# Patient Record
Sex: FEMALE | Race: WHITE
[De-identification: names, ages, dates, MRNs, and addresses within clinical notes are randomized per-mention and may not be internally consistent; named-entity substitution may affect disease eponyms.]

---

## 2019-02-28 ENCOUNTER — HOSPITAL ENCOUNTER (OUTPATIENT)
Dept: HOSPITAL 61 - SURG | Age: 54
Setting detail: OBSERVATION
LOS: 1 days | Discharge: HOME | End: 2019-03-01
Attending: OTOLARYNGOLOGY | Admitting: OTOLARYNGOLOGY
Payer: COMMERCIAL

## 2019-02-28 VITALS — DIASTOLIC BLOOD PRESSURE: 89 MMHG | SYSTOLIC BLOOD PRESSURE: 145 MMHG

## 2019-02-28 VITALS — SYSTOLIC BLOOD PRESSURE: 139 MMHG | DIASTOLIC BLOOD PRESSURE: 89 MMHG

## 2019-02-28 VITALS — DIASTOLIC BLOOD PRESSURE: 90 MMHG | SYSTOLIC BLOOD PRESSURE: 132 MMHG

## 2019-02-28 VITALS — SYSTOLIC BLOOD PRESSURE: 135 MMHG | DIASTOLIC BLOOD PRESSURE: 80 MMHG

## 2019-02-28 VITALS — WEIGHT: 191 LBS | BODY MASS INDEX: 29.98 KG/M2 | HEIGHT: 67 IN

## 2019-02-28 VITALS — SYSTOLIC BLOOD PRESSURE: 133 MMHG | DIASTOLIC BLOOD PRESSURE: 80 MMHG

## 2019-02-28 VITALS — SYSTOLIC BLOOD PRESSURE: 123 MMHG | DIASTOLIC BLOOD PRESSURE: 73 MMHG

## 2019-02-28 DIAGNOSIS — I10: ICD-10-CM

## 2019-02-28 DIAGNOSIS — E11.9: ICD-10-CM

## 2019-02-28 DIAGNOSIS — E04.2: Primary | ICD-10-CM

## 2019-02-28 DIAGNOSIS — Z90.710: ICD-10-CM

## 2019-02-28 LAB
CALCIUM PTH: 9.1 MG/DL (ref 8.7–10.2)
CREATININE PTH: 0.61 MG/DL (ref 0.57–1)
PHOSPHORUS PTH: 3.1 MG/DL (ref 2.5–4.5)
PTH-INTACT SERPL-MCNC: 31 PG/ML (ref 15–65)

## 2019-02-28 PROCEDURE — A7015 AEROSOL MASK USED W NEBULIZE: HCPCS

## 2019-02-28 PROCEDURE — 96374 THER/PROPH/DIAG INJ IV PUSH: CPT

## 2019-02-28 PROCEDURE — 88331 PATH CONSLTJ SURG 1 BLK 1SPC: CPT

## 2019-02-28 PROCEDURE — 85025 COMPLETE CBC W/AUTO DIFF WBC: CPT

## 2019-02-28 PROCEDURE — G0378 HOSPITAL OBSERVATION PER HR: HCPCS

## 2019-02-28 PROCEDURE — G0379 DIRECT REFER HOSPITAL OBSERV: HCPCS

## 2019-02-28 PROCEDURE — 88305 TISSUE EXAM BY PATHOLOGIST: CPT

## 2019-02-28 PROCEDURE — 36415 COLL VENOUS BLD VENIPUNCTURE: CPT

## 2019-02-28 PROCEDURE — 83970 ASSAY OF PARATHORMONE: CPT

## 2019-02-28 PROCEDURE — 60240 REMOVAL OF THYROID: CPT

## 2019-02-28 PROCEDURE — 80048 BASIC METABOLIC PNL TOTAL CA: CPT

## 2019-02-28 PROCEDURE — 82962 GLUCOSE BLOOD TEST: CPT

## 2019-02-28 PROCEDURE — 82310 ASSAY OF CALCIUM: CPT

## 2019-02-28 PROCEDURE — 96376 TX/PRO/DX INJ SAME DRUG ADON: CPT

## 2019-02-28 PROCEDURE — 83735 ASSAY OF MAGNESIUM: CPT

## 2019-02-28 RX ADMIN — LABETALOL HYDROCHLORIDE PRN MG: 5 INJECTION, SOLUTION INTRAVENOUS at 16:54

## 2019-02-28 RX ADMIN — DOCUSATE SODIUM SCH MG: 100 CAPSULE, LIQUID FILLED ORAL at 21:00

## 2019-02-28 RX ADMIN — BENZOCAINE AND MENTHOL PRN LOZ: 15; 3.6 LOZENGE ORAL at 22:04

## 2019-02-28 RX ADMIN — MORPHINE SULFATE PRN MG: 2 INJECTION, SOLUTION INTRAMUSCULAR; INTRAVENOUS at 18:13

## 2019-02-28 RX ADMIN — BENZOCAINE AND MENTHOL PRN LOZ: 15; 3.6 LOZENGE ORAL at 18:07

## 2019-02-28 RX ADMIN — MUPIROCIN SCH APP: 20 OINTMENT TOPICAL at 22:19

## 2019-02-28 RX ADMIN — LABETALOL HYDROCHLORIDE PRN MG: 5 INJECTION, SOLUTION INTRAVENOUS at 17:28

## 2019-02-28 NOTE — PDOC4
IMMEDIATE POST OP NOTE


Date:  Feb 28, 2019


Pre-Op Diagnosis


multinodular goiter


Post-Op Diagnosis


same as above


Procedure Performed


total thyroidectomy


Surgeon


Dr. Jennifer Cuenca


Assistant


none


Anesthesiologist


Dr. Correa


Anesthesia Type:  General


Blood Loss


50mL


Specimens Obtained


1.  left thyroid lobe and isthmus


2.  Right thyroid lobe


3.  Left thyroid tissue r/o parathyroid


Findings


1.  Large multinodular goiter on left side with dominant nodules measuring >4cm


2.  Multiple small nodule present within right thyroid gland


3.  Recurrent laryngeal nerves intact bilaterally


4.  Left parathyroid tissue was attached to thyroid specimen at the end of the 

case.  This was reimplanted within left sternocleidomastoid muscle


Complications


none


Operative Note


#[id number]











JENNIFER CUENCA MD Feb 28, 2019 16:29

## 2019-02-28 NOTE — OP
DATE OF SURGERY:  02/28/2019



PREOPERATIVE DIAGNOSIS:  Multinodular thyroid goiter.



POSTOPERATIVE DIAGNOSIS:  Multinodular thyroid goiter.



PROCEDURE PERFORMED:  Total thyroidectomy.



SURGEON:  Jennifer Cuenca MD



ANESTHESIA:  General endotracheal anesthesia.



INDICATIONS FOR SURGERY:  The patient is a 54-year-old female with a history of

very enlarged left thyroid nodule that measures greater than 4 cm.  The patient

was found to have multiple nodules bilaterally.  Left side is beginning to

significantly enlarged and causing compressive symptoms.  The patient had an

FNA, which was consistent with a benign thyroid nodule.  Due to the continuing

growth and enlarged thyroid gland bilaterally, the decision made for the patient

to undergo the above procedure after the risks, benefits, and alternatives of

surgery were thoroughly discussed with the patient and informed consent was

obtained.



ESTIMATED BLOOD LOSS:  50 mL.



ANESTHESIOLOGIST:  Valdemar Correa MD



SPECIMENS OBTAINED:  The left thyroid lobe and isthmus, the right thyroid lobe

and left thyroid tissue that was sent to rule out parathyroid tissue.



INTRAOPERATIVE FINDINGS:

1.  A large multinodular goiter was present on the left side with a dominant

nodule inferiorly measuring approximately 4 cm.

2.  Multiple smaller nodules present within the right thyroid gland and isthmus.

3.  The recurrent laryngeal nerves were intact at the end of the case and

stimulated at 0.5 milliamps.

4.  Left parathyroid tissue was found attached to the left thyroid specimen at 
the

end of the case.  A section was sent for frozen pathology, which confirmed

parathyroid tissue and this was reimplanted within the left sternocleidomastoid

musculature.



DESCRIPTION OF THE PROCEDURE:  The patient was brought back to the operating

room by Anesthesia and intubated with a nerve integrity monitor, endotracheal

tube in a standard fashion.  A shoulder roll was placed on the patient's

shoulders and a 5 cm incision was planned approximately 2 fingerbreadths above

the sternal notch.  This was injected in a subcutaneous fashion with 1%

lidocaine with 1:100,000 epinephrine.  The patient was then prepped and draped

in standard fashion.  Nerve integrity monitor was also set up and verified for

accuracy.  This was used throughout the case to protect the recurrent laryngeal

nerves.  A 15 blade was then used to cut down to the skin and subcutaneous

tissue.  Bovie electrocautery was further used to cut down through the

subcutaneous fat and subplatysmal layers.  Subplatysmal flaps were then elevated

superiorly to the thyroid notch, inferiorly to the sternal notch, laterally to

the sternocleidomastoid muscles bilaterally.  Mauricio retractor was placed to

protect the skin edges throughout the case.  The strap musculature was then

 at the midline and I dissected the strap musculature off the anterior

surface of the left thyroid gland.  Left thyroid gland was severely enlarged

with multiple thyroid nodules present.  I carefully dissected down to the

superior pole of the thyroid gland using LigaSure cautery.  I also dissected on

the lateral aspect dissecting through the middle thyroid vein.  We gently

retracted the gland medially.  In doing so, we did compress one of the largest

thyroid nodules that was present superiorly.  I identified on the inferior

undersurface of the thyroid gland.  I identified the recurrent laryngeal nerve

and traced it until I entered into the larynx at the cricothyroid notch.  Using

bipolar electrocautery, I dissected the thyroid specimen away from the recurrent

laryngeal nerve.  I dissected through Berry ligament using bipolar

electrocautery as well as a 15 blade.  Once I was carefully away from the

recurrent laryngeal nerve, I continued to dissect the large thyroid specimen

away  from the surrounding tissue using LigaSure cautery.  At the paratracheal

tissue, I used Bovie electrocautery to dissect the thyroid gland away from the

paratracheal tissue.  I continued my dissection towards the midline and then cut

through the thyroid isthmus using LigaSure cautery.  I inspected this large

thyroid specimen on the back table.  There was a tissue attached to the deep

surface of the thyroid gland that was concerning for possible parathyroid

tissue.  I dissected this away from the thyroid tissue and took a small piece

and send it to the lab for pathology.  Frozen section confirmed a parathyroid 
tissue.  The

remaining parathyroid gland was placed in saline for the use at the end of

the case.  I then examined the surgical bed on the left side.  Adequate

hemostasis was confirmed using bipolar electrocautery.  I did identify tissue

that appeared to be the superior parathyroid tissue that was intact at the end

of the case and had a good vascular supply.  The recurrent laryngeal was again

identified and stimulated at 0.5 milliamps.  



My attention was then taken to the right thyroid gland.  In a similar fashion, 
I dissected the strap musculature

off the anterior surface of the right thyroid gland.  There were multiple

nodules present on the right thyroid gland, but this gland was not nearly as

enlarged as the left.  I then carefully dissected around the superior pole of

the thyroid gland using LigaSure cautery to dissect through the superior

vascular bundle.  I also dissected through the middle thyroid vein laterally and

retracted the gland medially.  I carefully used bipolar electrocautery to

dissect on the deep surface of the thyroid gland.  I identified parathyroid

tissue.  This was carefully dissected away from the thyroid gland.  I also

identified the recurrent laryngeal nerve and traced it until I entered into the

larynx at the cricothyroid notch.  I carefully used bipolar electrocautery to

dissect the thyroid gland away from the recurrent laryngeal nerve.  I also

dissected through Berry ligament using bipolar cautery as well as a 15 blade. 

Once I was safely away from the recurrent laryngeal nerve, I dissected the

remaining aspect of the thyroid gland away from the surrounding tissue using

LigaSure cautery.  I dissected the paratracheal tissue off of the trachea using

Bovie electrocautery.  The right thyroid specimen was completely removed.  This

was then again inspected at the back table.  There was no evidence of attached

parathyroid tissue.  This was sent for permanent pathology.  I then again

inspected the surgical bed.  I used bipolar electrocautery to obtain hemostasis.

 The recurrent laryngeal nerve was again identified on the right side and it

stimulated at 0.5 milliamps at the end of the case.  The parathyroid tissue that

was right near the recurrent laryngeal nerve as it entered into the larynx,

again appeared healthy at this point in the case.  I ensured adequate hemostasis

on the left side as well.  Tami powder was placed within the surgical bed

bilaterally.  A 10-[place] fully fluted drain was placed within the surgical bed.

 This was brought out through the midline of the strap musculature.  The strap

musculature was closed with a 3-0 Vicryl suture.  The Mauricio retractor was

removed.  I then made a stab incision just left lateral to my incision and the

drain was brought out through the stab incision.  I sutured the drain to the

skin using 3-0 nylon suture.  I ensured adequate hemostasis in the subplatysmal

layer.  The platysma and deep dermal layers were then closed with buried

interrupted sutures of 4-0 Vicryl and a running horizontal mattress suture of

5-0 Prolene was used to close the skin.  Mupirocin ointment was placed along the

incision.  The patient was now turned back over to Anesthesia and extubated

without complication.  Our sponge and instrument counts correct at the end of

the case.



COMPLICATIONS:  None.



DISPOSITION:  Stable and transferred to recovery.

 



______________________________

JENNIFER CUENCA MD



DR:  ISRAEL/reid  JOB#:  [id number] / [id number]

DD:  02/28/2019 16:29  DT:  02/28/2019 17:43

FINA

## 2019-02-28 NOTE — NUR
Pt arrives via bed from PACU. Pt A&Ox4 c/o neck pain 5/10. Head to toe assessment complete. 
Incision KASHIF with BROWN seen. No bleeding at this time. Pt R lateral neck noticeable larger 
then L. Dr. Cuenca notified without new orders placed. Pt  at bedside. Pt laying in 
bed with call light in reach. SERGO Lewis completing admission questions. Home prescriptions 
left by Dr. Cuenca given to  to fill. Copies made and placed on chart.

## 2019-03-01 VITALS — DIASTOLIC BLOOD PRESSURE: 68 MMHG | SYSTOLIC BLOOD PRESSURE: 108 MMHG

## 2019-03-01 VITALS — SYSTOLIC BLOOD PRESSURE: 110 MMHG | DIASTOLIC BLOOD PRESSURE: 71 MMHG

## 2019-03-01 VITALS — DIASTOLIC BLOOD PRESSURE: 75 MMHG | SYSTOLIC BLOOD PRESSURE: 110 MMHG

## 2019-03-01 LAB
ANION GAP SERPL CALC-SCNC: 13 MMOL/L (ref 6–14)
BASOPHILS # BLD AUTO: 0 X10^3/UL (ref 0–0.2)
BASOPHILS NFR BLD: 0 % (ref 0–3)
BUN SERPL-MCNC: 13 MG/DL (ref 7–20)
CALCIUM PTH: 8.9 MG/DL (ref 8.7–10.2)
CALCIUM SERPL-MCNC: 8.8 MG/DL (ref 8.5–10.1)
CHLORIDE SERPL-SCNC: 104 MMOL/L (ref 98–107)
CO2 SERPL-SCNC: 24 MMOL/L (ref 21–32)
CREAT SERPL-MCNC: 0.7 MG/DL (ref 0.6–1)
CREATININE PTH: 0.61 MG/DL (ref 0.57–1)
EOSINOPHIL NFR BLD: 0 % (ref 0–3)
EOSINOPHIL NFR BLD: 0 X10^3/UL (ref 0–0.7)
ERYTHROCYTE [DISTWIDTH] IN BLOOD BY AUTOMATED COUNT: 13.7 % (ref 11.5–14.5)
GFR SERPLBLD BASED ON 1.73 SQ M-ARVRAT: 86.6 ML/MIN
GLUCOSE SERPL-MCNC: 178 MG/DL (ref 70–99)
HCT VFR BLD CALC: 38.9 % (ref 36–47)
HGB BLD-MCNC: 12.9 G/DL (ref 12–15.5)
LYMPHOCYTES # BLD: 2.1 X10^3/UL (ref 1–4.8)
LYMPHOCYTES NFR BLD AUTO: 15 % (ref 24–48)
MCH RBC QN AUTO: 29 PG (ref 25–35)
MCHC RBC AUTO-ENTMCNC: 33 G/DL (ref 31–37)
MCV RBC AUTO: 88 FL (ref 79–100)
MONO #: 0.2 X10^3/UL (ref 0–1.1)
MONOCYTES NFR BLD: 2 % (ref 0–9)
NEUT #: 11.4 X10^3UL (ref 1.8–7.7)
NEUTROPHILS NFR BLD AUTO: 83 % (ref 31–73)
PHOSPHORUS PTH: 2.3 MG/DL (ref 2.5–4.5)
PLATELET # BLD AUTO: 225 X10^3/UL (ref 140–400)
POTASSIUM SERPL-SCNC: 3.6 MMOL/L (ref 3.5–5.1)
PTH-INTACT SERPL-MCNC: 26 PG/ML (ref 15–65)
RBC # BLD AUTO: 4.44 X10^6/UL (ref 3.5–5.4)
SODIUM SERPL-SCNC: 141 MMOL/L (ref 136–145)
WBC # BLD AUTO: 13.7 X10^3/UL (ref 4–11)

## 2019-03-01 RX ADMIN — MORPHINE SULFATE PRN MG: 2 INJECTION, SOLUTION INTRAMUSCULAR; INTRAVENOUS at 02:24

## 2019-03-01 RX ADMIN — MUPIROCIN SCH APP: 20 OINTMENT TOPICAL at 08:34

## 2019-03-01 RX ADMIN — DOCUSATE SODIUM SCH MG: 100 CAPSULE, LIQUID FILLED ORAL at 08:35

## 2019-03-01 RX ADMIN — OXYCODONE HYDROCHLORIDE AND ACETAMINOPHEN PRN TAB: 5; 325 TABLET ORAL at 11:29

## 2019-03-01 RX ADMIN — OXYCODONE HYDROCHLORIDE AND ACETAMINOPHEN PRN TAB: 5; 325 TABLET ORAL at 06:09

## 2019-03-01 RX ADMIN — MUPIROCIN SCH APP: 20 OINTMENT TOPICAL at 13:57

## 2019-03-01 NOTE — NUR
SW following for discharge planning. Discussed with RN, RN advised no SW needs at this time. 
SW will continue to follow.

## 2019-03-01 NOTE — NUR
Pt. discharged to home, Rx given to  yesterday per Dr. JAYNE Cuenca. Pt verbalized 
understanding of discharge instructions. Neck incision CDI, no oozing noted. BROWN drain d/c'd 
per Dr. JAYNE Cuenca.